# Patient Record
Sex: FEMALE | Race: ASIAN | NOT HISPANIC OR LATINO | Employment: FULL TIME | URBAN - METROPOLITAN AREA
[De-identification: names, ages, dates, MRNs, and addresses within clinical notes are randomized per-mention and may not be internally consistent; named-entity substitution may affect disease eponyms.]

---

## 2021-04-30 ENCOUNTER — COMMUNICATION - HEALTHEAST (OUTPATIENT)
Dept: CARDIOLOGY | Facility: CLINIC | Age: 46
End: 2021-04-30

## 2021-06-21 NOTE — LETTER
Letter by Geoff Del Cid MD at      Author: Geoff Del Cid MD Service: -- Author Type: --    Filed:  Encounter Date: 4/30/2021 Status: (Other)         Eusebio Saunders  [address] [place] [address]      April 30, 2021      Dear Pa,    This letter is to remind you that you are due for your follow up appointment with Dr. Geoff Del Cid  . To help ensure you are in the best health possible, a regular follow-up with your cardiologist is essential.     Please call our Patient Scheduling Line at [phone] to schedule your appointment at your earliest convenience.  If you have recently scheduled an appointment, please disregard this letter.    We look forward to seeing you again. As always, we are available at the number  above for any questions or concerns you may have.      Sincerely,     The Physicians and Staff of [facility]

## 2022-01-12 ENCOUNTER — HOSPITAL ENCOUNTER (EMERGENCY)
Facility: HOSPITAL | Age: 47
Discharge: HOME | End: 2022-01-12
Attending: EMERGENCY MEDICINE | Admitting: EMERGENCY MEDICINE
Payer: COMMERCIAL

## 2022-01-12 ENCOUNTER — APPOINTMENT (OUTPATIENT)
Dept: CT IMAGING | Facility: HOSPITAL | Age: 47
End: 2022-01-12
Attending: EMERGENCY MEDICINE
Payer: COMMERCIAL

## 2022-01-12 VITALS
HEART RATE: 80 BPM | TEMPERATURE: 98.4 F | SYSTOLIC BLOOD PRESSURE: 158 MMHG | DIASTOLIC BLOOD PRESSURE: 108 MMHG | BODY MASS INDEX: 25.19 KG/M2 | WEIGHT: 129 LBS | RESPIRATION RATE: 16 BRPM
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
## 2022-01-12 LAB
HCG UR QL: NEGATIVE
INTERNAL QC OK POCT: NORMAL
POCT KIT EXPIRATION DATE: NORMAL
POCT KIT LOT NUMBER: NORMAL

## 2022-01-12 PROCEDURE — 72125 CT NECK SPINE W/O DYE: CPT

## 2022-01-12 PROCEDURE — 81025 URINE PREGNANCY TEST: CPT | Performed by: EMERGENCY MEDICINE

## 2022-01-12 PROCEDURE — 81025 URINE PREGNANCY TEST: CPT | Performed by: STUDENT IN AN ORGANIZED HEALTH CARE EDUCATION/TRAINING PROGRAM

## 2022-01-12 PROCEDURE — 99284 EMERGENCY DEPT VISIT MOD MDM: CPT | Mod: 25

## 2022-01-12 RX ORDER — CYCLOBENZAPRINE HCL 10 MG
10 TABLET ORAL 3 TIMES DAILY PRN
Qty: 20 TABLET | Refills: 0 | Status: SHIPPED | OUTPATIENT
Start: 2022-01-12 | End: 2022-01-19

## 2022-01-12 NOTE — Clinical Note
Eusebio Saunders was seen and treated in our emergency department on 1/12/2022.  She may return to work on 01/15/2022.       If you have any questions or concerns, please don't hesitate to call.      Danilo Tellez MD

## 2022-01-12 NOTE — Clinical Note
Norma Bah accompanied Pa Nicky to the emergency department on 1/12/2022. They may return to work on 01/15/2022.      If you have any questions or concerns, please don't hesitate to call.      Danilo Tellez MD
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
## 2024-05-15 ENCOUNTER — HOSPITAL ENCOUNTER (EMERGENCY)
Facility: CLINIC | Age: 49
Discharge: HOME | End: 2024-05-16
Admitting: PHYSICIAN ASSISTANT
Payer: COMMERCIAL

## 2024-05-15 DIAGNOSIS — E87.6 HYPOKALEMIA: ICD-10-CM

## 2024-05-15 DIAGNOSIS — R53.83 FATIGUE: ICD-10-CM

## 2024-05-15 LAB
ALBUMIN SERPL BCG-MCNC: 4.6 G/DL (ref 3.5–5.2)
ALBUMIN UR-MCNC: NEGATIVE MG/DL
ALP SERPL-CCNC: 51 U/L (ref 40–150)
ALT SERPL W P-5'-P-CCNC: 21 U/L (ref 0–50)
ANION GAP SERPL CALCULATED.3IONS-SCNC: 13 MMOL/L (ref 7–15)
APPEARANCE UR: CLEAR
AST SERPL W P-5'-P-CCNC: 20 U/L (ref 0–45)
BILIRUB SERPL-MCNC: 0.4 MG/DL
BILIRUB UR QL STRIP: NEGATIVE
BUN SERPL-MCNC: 4.9 MG/DL (ref 6–20)
CALCIUM SERPL-MCNC: 10 MG/DL (ref 8.6–10)
CHLORIDE SERPL-SCNC: 104 MMOL/L (ref 98–107)
COLOR UR AUTO: ABNORMAL
CREAT SERPL-MCNC: 0.43 MG/DL (ref 0.51–0.95)
DEPRECATED HCO3 PLAS-SCNC: 23 MMOL/L (ref 22–29)
EGFRCR SERPLBLD CKD-EPI 2021: >90 ML/MIN/1.73M2
ERYTHROCYTE [DISTWIDTH] IN BLOOD BY AUTOMATED COUNT: 14.6 % (ref 10–15)
GLUCOSE SERPL-MCNC: 122 MG/DL (ref 70–99)
GLUCOSE UR STRIP-MCNC: NEGATIVE MG/DL
HCG INTACT+B SERPL-ACNC: <1 MIU/ML
HCT VFR BLD AUTO: 38.4 % (ref 35–47)
HGB BLD-MCNC: 12.8 G/DL (ref 11.7–15.7)
HGB UR QL STRIP: NEGATIVE
HOLD SPECIMEN: NORMAL
KETONES UR STRIP-MCNC: NEGATIVE MG/DL
LEUKOCYTE ESTERASE UR QL STRIP: NEGATIVE
MAGNESIUM SERPL-MCNC: 1.9 MG/DL (ref 1.7–2.3)
MCH RBC QN AUTO: 28.9 PG (ref 26.5–33)
MCHC RBC AUTO-ENTMCNC: 33.3 G/DL (ref 31.5–36.5)
MCV RBC AUTO: 87 FL (ref 78–100)
MUCOUS THREADS #/AREA URNS LPF: PRESENT /LPF
NITRATE UR QL: NEGATIVE
PH UR STRIP: 6 [PH] (ref 5–7)
PLATELET # BLD AUTO: 333 10E3/UL (ref 150–450)
POTASSIUM SERPL-SCNC: 2.9 MMOL/L (ref 3.4–5.3)
PROT SERPL-MCNC: 7 G/DL (ref 6.4–8.3)
RBC # BLD AUTO: 4.43 10E6/UL (ref 3.8–5.2)
RBC URINE: 1 /HPF
SODIUM SERPL-SCNC: 140 MMOL/L (ref 135–145)
SP GR UR STRIP: 1.01 (ref 1–1.03)
SQUAMOUS EPITHELIAL: 1 /HPF
TSH SERPL DL<=0.005 MIU/L-ACNC: 1.34 UIU/ML (ref 0.3–4.2)
UROBILINOGEN UR STRIP-MCNC: <2 MG/DL
WBC # BLD AUTO: 6.8 10E3/UL (ref 4–11)
WBC URINE: 1 /HPF

## 2024-05-15 PROCEDURE — 99284 EMERGENCY DEPT VISIT MOD MDM: CPT | Mod: 25

## 2024-05-15 PROCEDURE — 83735 ASSAY OF MAGNESIUM: CPT | Performed by: PHYSICIAN ASSISTANT

## 2024-05-15 PROCEDURE — 81001 URINALYSIS AUTO W/SCOPE: CPT | Performed by: PHYSICIAN ASSISTANT

## 2024-05-15 PROCEDURE — 36415 COLL VENOUS BLD VENIPUNCTURE: CPT | Performed by: PHYSICIAN ASSISTANT

## 2024-05-15 PROCEDURE — 258N000003 HC RX IP 258 OP 636: Performed by: PHYSICIAN ASSISTANT

## 2024-05-15 PROCEDURE — 250N000013 HC RX MED GY IP 250 OP 250 PS 637: Performed by: PHYSICIAN ASSISTANT

## 2024-05-15 PROCEDURE — 80053 COMPREHEN METABOLIC PANEL: CPT | Performed by: PHYSICIAN ASSISTANT

## 2024-05-15 PROCEDURE — 84443 ASSAY THYROID STIM HORMONE: CPT | Performed by: PHYSICIAN ASSISTANT

## 2024-05-15 PROCEDURE — 250N000011 HC RX IP 250 OP 636: Performed by: PHYSICIAN ASSISTANT

## 2024-05-15 PROCEDURE — 96365 THER/PROPH/DIAG IV INF INIT: CPT

## 2024-05-15 PROCEDURE — 84702 CHORIONIC GONADOTROPIN TEST: CPT | Performed by: PHYSICIAN ASSISTANT

## 2024-05-15 PROCEDURE — 85014 HEMATOCRIT: CPT | Performed by: PHYSICIAN ASSISTANT

## 2024-05-15 PROCEDURE — 93005 ELECTROCARDIOGRAM TRACING: CPT | Performed by: PHYSICIAN ASSISTANT

## 2024-05-15 RX ORDER — POTASSIUM CHLORIDE 1500 MG/1
40 TABLET, EXTENDED RELEASE ORAL ONCE
Status: COMPLETED | OUTPATIENT
Start: 2024-05-15 | End: 2024-05-15

## 2024-05-15 RX ORDER — POTASSIUM CHLORIDE 7.45 MG/ML
10 INJECTION INTRAVENOUS ONCE
Status: COMPLETED | OUTPATIENT
Start: 2024-05-15 | End: 2024-05-16

## 2024-05-15 RX ORDER — POTASSIUM CHLORIDE 1500 MG/1
20 TABLET, EXTENDED RELEASE ORAL DAILY
Qty: 3 TABLET | Refills: 0 | Status: SHIPPED | OUTPATIENT
Start: 2024-05-15 | End: 2024-05-18

## 2024-05-15 RX ADMIN — POTASSIUM CHLORIDE 10 MEQ: 7.46 INJECTION, SOLUTION INTRAVENOUS at 23:09

## 2024-05-15 RX ADMIN — SODIUM CHLORIDE 500 ML: 9 INJECTION, SOLUTION INTRAVENOUS at 23:09

## 2024-05-15 RX ADMIN — POTASSIUM CHLORIDE 40 MEQ: 1500 TABLET, EXTENDED RELEASE ORAL at 23:09

## 2024-05-15 ASSESSMENT — COLUMBIA-SUICIDE SEVERITY RATING SCALE - C-SSRS
1. IN THE PAST MONTH, HAVE YOU WISHED YOU WERE DEAD OR WISHED YOU COULD GO TO SLEEP AND NOT WAKE UP?: NO
2. HAVE YOU ACTUALLY HAD ANY THOUGHTS OF KILLING YOURSELF IN THE PAST MONTH?: NO
6. HAVE YOU EVER DONE ANYTHING, STARTED TO DO ANYTHING, OR PREPARED TO DO ANYTHING TO END YOUR LIFE?: NO

## 2024-05-15 ASSESSMENT — ACTIVITIES OF DAILY LIVING (ADL)
ADLS_ACUITY_SCORE: 35

## 2024-05-16 VITALS
HEART RATE: 61 BPM | SYSTOLIC BLOOD PRESSURE: 161 MMHG | TEMPERATURE: 98.3 F | OXYGEN SATURATION: 100 % | DIASTOLIC BLOOD PRESSURE: 94 MMHG | RESPIRATION RATE: 15 BRPM

## 2024-05-16 LAB
ATRIAL RATE - MUSE: 57 BPM
DIASTOLIC BLOOD PRESSURE - MUSE: 101 MMHG
INTERPRETATION ECG - MUSE: NORMAL
P AXIS - MUSE: 41 DEGREES
PR INTERVAL - MUSE: 184 MS
QRS DURATION - MUSE: 94 MS
QT - MUSE: 458 MS
QTC - MUSE: 445 MS
R AXIS - MUSE: 63 DEGREES
SYSTOLIC BLOOD PRESSURE - MUSE: 158 MMHG
T AXIS - MUSE: 61 DEGREES
VENTRICULAR RATE- MUSE: 57 BPM

## 2024-05-16 NOTE — DISCHARGE INSTRUCTIONS
As we discussed, your pregnancy test today was negative.  Your potassium was low which may be contributing to your fatigue.  Will send you home on several days of potassium supplementation.  I recommend that you eat potassium in your diet such as banana, spinach, leafy greens.  I would like you to have your potassium rechecked in your clinic in 1 week to make sure this is improving.  If at any time you develop fever, chest pain, shortness of breath, loss of consciousness, palpitations, dizziness, worsening back pain, numbness or weakness in your arms or legs, numbness in your groin, or any new or concerning symptoms please return to the ER for further evaluation.    Your blood pressure was elevated in the emergencydepartment today and requires recheck and close follow-up in your primary care clinic. Untreated blood pressure can cause serious complications including, but not limited to stroke, heart attack/failure, and kidney disease.  Please make a close follow-up appointment to have this recheck performed. Please return to the emergency department immediately if you develop a severe headache, vision changes, chest pain, shortness of breath, orabdominal pain.

## 2024-05-16 NOTE — ED TRIAGE NOTES
Pt arrives to ed with c/o  of nausea and vomiting, headache and low back pain 4/10. Denies sob, and cp.      Triage Assessment (Adult)       Row Name 05/15/24 1958          Triage Assessment    Airway WDL WDL        Respiratory WDL    Respiratory WDL WDL        Cardiac WDL    Cardiac WDL WDL        Cognitive/Neuro/Behavioral WDL    Cognitive/Neuro/Behavioral WDL WDL

## 2024-05-16 NOTE — ED NOTES
AVS reviewed with pt. Education provided on worsening symptoms to watch out for and close follow-up with PCP. All questions were answered. Vitally stable upon discharge.

## 2024-05-16 NOTE — ED NOTES
Pt says she took a pregnancy test on Sunday (5/12) which appeared to be positive. LMP April 2nd, 2024.

## 2024-05-16 NOTE — ED PROVIDER NOTES
Emergency Department Encounter   NAME: Eusebio Saunders ; AGE: 48 year old female ; YOB: 1975 ; MRN: [id number] ; PCP: Malika Mccord   ED PROVIDER: Kirstie Guo PA-C    Evaluation Date & Time:   5/15/2024  8:47 PM    CHIEF COMPLAINT:  Nausea, Vomiting, & Diarrhea      Impression and Plan   MDM:   Eusebio Saunders is a 48 year old female with a pertinent history of hypertensive disorder, muscle spasm of neck, prediabetes, vitamin D deficiency, who presents to the ED by private vehicle for evaluation of fatigue.  Per patient, she is 2 weeks late for her menstrual period, had a positive home pregnancy test, and has felt fatigued especially after eating over the past several weeks and is here in the emergency department to confirm her pregnancy.  Here in the ED, she is very pleasant and in no acute distress.  Moderately hypertensive with a known history of hypertension though vitally stable.  Her exam is significant for some mild tenderness across her lumbar low back without any focal areas of concern.  We discussed plan at this time for laboratory workup to assess her fatigue as well as an hCG beta quant to determine pregnancy.  Without any falls or trauma to the back, low suspicion for compression fracture and imaging is not warranted.  Will obtain UA to rule out UTI, pyelonephritis, nephrolithiasis.  No pain that is consistent with renal colic.  Abdominal exam is completely benign -low suspicion for any intra-abdominal pathology and CT imaging is not warranted at this time.  No fevers, IV drug abuse, immunosuppression, or tenderness along midline spine to suggest spinous infectious process.    UA negative for hematuria to suggest stone.  No signs of infection.  TSH within normal limits.  No anemia.  No fever or leukocytosis to suggest developing infectious process.  Potassium returned mildly low at 2.9.  Magnesium within normal limits.  EKG obtained which showed normal sinus rhythm, no evidence of QTc prolongation,  pathologic arrhythmia.  She does have nonspecific T wave inversions in her anterior leads which is unchanged from previous EKG from 10/28/2020.  It does appear that she may have some U waves in her inferior leads likely due to her mild hypokalemia. Her potassium was replaced with 40 mill equivalents orally and 10 IV.  She was given oral potassium supplementation for the next several days at home and we discussed dietary changes to increase potassium. Will have her recheck her potassium with her PCP in one week.  This may be contributing to her mild fatigue.  Her hCG beta quant returned undetectable and given that she is 2 weeks past her last menstrual period do feel that this correlates with negative pregnancy results.  We discussed her home test could have been due to a false positive result or early pregnancy failure.  She still feels that she may be pregnant though I discussed very low likelihood of this.  When she has her potassium could repeat a pregnancy test they felt this was indicated.  At this time, she is clinically well-appearing, she states she has not actually had nausea and vomiting at home per the triage note though has felt fatigued after eating and again with a completely benign abdominal exam, no indication for imaging at this time.  We discussed supportive measures, close follow-up in clinic, concerning signs and symptoms return to the ED.  She verbalized understanding is comfortable plan.  Discharged home in good condition for    *Offered patient professional [facility] interpretor, however she declined and preferred that her  interpret.     Medical Decision Making  Obtained supplemental history:Supplemental history obtained?: Family Member/Significant Other  Reviewed external records: External records reviewed?: No  Care impacted by chronic illness:Hypertension  Care significantly affected by social determinants of health:Access to Medical Care  Did you consider but not order tests?: Work up  considered but not performed and documented in chart, if applicable  Did you interpret images independently?: Independent interpretation of ECG and images noted in documentation, when applicable.  Consultation discussion with other provider:Did you involve another provider (consultant, , pharmacy, etc.)?: No  Discharge. I prescribed additional prescription strength medication(s) as charted. See documentation for any additional details.    ED COURSE:  9:53 PM I met and introduced myself to the patient. I gathered initial history and performed my physical exam. We discussed plan for initial workup.   10:55 PM I discussed EKG with Dr. Munoz, ED MD.   11:09 PM I rechecked the patient and discussed results, discharge, follow up, and reasons to return to the ED.     At the conclusion of the encounter I discussed the results of all the tests and the disposition. The questions were answered. The patient or family acknowledged understanding and was agreeable with the care plan.    FINAL IMPRESSION:    ICD-10-CM    1. Fatigue  R53.83       2. Hypokalemia  E87.6             MEDICATIONS GIVEN IN THE EMERGENCY DEPARTMENT:  Medications   potassium chloride baljeet ER (KLOR-CON M20) CR tablet 40 mEq (has no administration in time range)   potassium chloride 10 mEq in 100 mL sterile water infusion (10 mEq Intravenous $New Bag 5/15/24 2309)   sodium chloride 0.9% BOLUS 500 mL (500 mLs Intravenous $New Bag 5/15/24 2309)         NEW PRESCRIPTIONS STARTED AT TODAY'S ED VISIT:  New Prescriptions    No medications on file         HPI   Use of Intrepreter: Yes - Hmong -      Eusebio Saunders is a 48 year old female with a pertinent history of hypertensive disorder, muscle spasm of neck, prediabetes, vitamin D deficiency, who presents to the ED by private vehicle for evaluation of fatigue.    The patient presents to the emergency department for evaluation after having a positive pregnancy test at home and being late for her menstrual period.   She would like to confirm her pregnancy.  She states the first day of her last menstrual period was 4/2.  It gradually over the past 2 weeks, she has developed fatigue, increased fatigue after eating.  At times she has low back pain.  She has not had any fevers or chills, abdominal pain, urinary symptoms, diarrhea, chest pain, or shortness of breath.  No numbness or weakness in her legs, saddle anesthesia, bladder or bowel incontinence.  She has 6 children at home and has had 1 previous miscarriage.  She states that she would be very happy if she is pregnant though wants to confirm.    REVIEW OF SYSTEMS:  Pertinent positive and negative symptoms per HPI.       Medical History     No past medical history on file.    No past surgical history on file.    No family history on file.    Social History     Tobacco Use    Smoking status: Never       ibuprofen (ADVIL,MOTRIN) 800 MG tablet  ondansetron (ZOFRAN) 4 MG tablet          Physical Exam     First Vitals:  Patient Vitals for the past 24 hrs:   BP Temp Temp src Pulse Resp SpO2   05/15/24 2252 (!) 153/91 -- -- 60 15 99 %   05/15/24 2100 (!) 158/101 -- -- 64 -- 100 %   05/15/24 2056 (!) 145/93 -- -- 67 -- 100 %   05/15/24 1957 (!) 187/96 98.3  F (36.8  C) Temporal 71 18 100 %         PHYSICAL EXAM:   Physical Exam  Vitals reviewed.   Constitutional:       General: She is not in acute distress.     Appearance: She is not ill-appearing, toxic-appearing or diaphoretic.   HENT:      Mouth/Throat:      Mouth: Mucous membranes are moist.      Pharynx: Oropharynx is clear.   Eyes:      Conjunctiva/sclera: Conjunctivae normal.   Cardiovascular:      Rate and Rhythm: Normal rate and regular rhythm.      Heart sounds: Normal heart sounds.   Pulmonary:      Effort: Pulmonary effort is normal.      Breath sounds: Normal breath sounds.   Abdominal:      General: Abdomen is flat. Bowel sounds are normal. There is no distension.      Palpations: Abdomen is soft.      Tenderness: There  is no abdominal tenderness. There is no right CVA tenderness, left CVA tenderness, guarding or rebound.   Musculoskeletal:      Comments: Mild tenderness along lumbar low back - no focal areas of tenderness. No edema, erythema or skin changes.    Neurological:      Mental Status: She is alert.             Results     LAB:  All pertinent labs reviewed and interpreted  Labs Ordered and Resulted from Time of ED Arrival to Time of ED Departure   ROUTINE UA WITH MICROSCOPIC REFLEX TO CULTURE - Abnormal       Result Value    Color Urine Light Yellow      Appearance Urine Clear      Glucose Urine Negative      Bilirubin Urine Negative      Ketones Urine Negative      Specific Gravity Urine 1.011      Blood Urine Negative      pH Urine 6.0      Protein Albumin Urine Negative      Urobilinogen Urine <2.0      Nitrite Urine Negative      Leukocyte Esterase Urine Negative      Mucus Urine Present (*)     RBC Urine 1      WBC Urine 1      Squamous Epithelials Urine 1     COMPREHENSIVE METABOLIC PANEL - Abnormal    Sodium 140      Potassium 2.9 (*)     Carbon Dioxide (CO2) 23      Anion Gap 13      Urea Nitrogen 4.9 (*)     Creatinine 0.43 (*)     GFR Estimate >90      Calcium 10.0      Chloride 104      Glucose 122 (*)     Alkaline Phosphatase 51      AST 20      ALT 21      Protein Total 7.0      Albumin 4.6      Bilirubin Total 0.4     CBC WITH PLATELETS - Normal    WBC Count 6.8      RBC Count 4.43      Hemoglobin 12.8      Hematocrit 38.4      MCV 87      MCH 28.9      MCHC 33.3      RDW 14.6      Platelet Count 333     HCG QUANTITATIVE PREGNANCY - Normal    hCG Quantitative <1     MAGNESIUM - Normal    Magnesium 1.9     TSH WITH FREE T4 REFLEX - Normal    TSH 1.34         RADIOLOGY:  No orders to display         Kirstie Guo PA-C   Emergency Medicine   [facility] EMERGENCY ROOM       Kirstie Guo PA-C  05/16/24 0035